# Patient Record
Sex: FEMALE | Race: ASIAN | Employment: FULL TIME | URBAN - METROPOLITAN AREA
[De-identification: names, ages, dates, MRNs, and addresses within clinical notes are randomized per-mention and may not be internally consistent; named-entity substitution may affect disease eponyms.]

---

## 2021-06-28 ENCOUNTER — LAB ENCOUNTER (OUTPATIENT)
Dept: LAB | Age: 36
End: 2021-06-28
Attending: OBSTETRICS & GYNECOLOGY
Payer: COMMERCIAL

## 2021-06-28 DIAGNOSIS — Z01.818 PREOP TESTING: ICD-10-CM

## 2021-06-29 LAB — SARS-COV-2 RNA RESP QL NAA+PROBE: NOT DETECTED

## 2021-06-30 ENCOUNTER — ANESTHESIA EVENT (OUTPATIENT)
Dept: SURGERY | Facility: HOSPITAL | Age: 36
End: 2021-06-30
Payer: COMMERCIAL

## 2021-06-30 ENCOUNTER — HOSPITAL ENCOUNTER (OUTPATIENT)
Facility: HOSPITAL | Age: 36
Setting detail: HOSPITAL OUTPATIENT SURGERY
Discharge: HOME | End: 2021-06-30
Attending: OBSTETRICS & GYNECOLOGY | Admitting: OBSTETRICS & GYNECOLOGY
Payer: COMMERCIAL

## 2021-06-30 ENCOUNTER — ANESTHESIA (OUTPATIENT)
Dept: SURGERY | Facility: HOSPITAL | Age: 36
End: 2021-06-30
Payer: COMMERCIAL

## 2021-06-30 VITALS
SYSTOLIC BLOOD PRESSURE: 93 MMHG | BODY MASS INDEX: 20.37 KG/M2 | RESPIRATION RATE: 19 BRPM | HEIGHT: 63 IN | WEIGHT: 115 LBS | DIASTOLIC BLOOD PRESSURE: 59 MMHG | TEMPERATURE: 98 F | HEART RATE: 61 BPM | OXYGEN SATURATION: 100 %

## 2021-06-30 DIAGNOSIS — Z01.818 PREOP TESTING: Primary | ICD-10-CM

## 2021-06-30 LAB — B-HCG UR QL: NEGATIVE

## 2021-06-30 PROCEDURE — 0UDB8ZX EXTRACTION OF ENDOMETRIUM, VIA NATURAL OR ARTIFICIAL OPENING ENDOSCOPIC, DIAGNOSTIC: ICD-10-PCS | Performed by: OBSTETRICS & GYNECOLOGY

## 2021-06-30 PROCEDURE — 88305 TISSUE EXAM BY PATHOLOGIST: CPT | Performed by: OBSTETRICS & GYNECOLOGY

## 2021-06-30 PROCEDURE — 81025 URINE PREGNANCY TEST: CPT

## 2021-06-30 PROCEDURE — 0UB98ZX EXCISION OF UTERUS, VIA NATURAL OR ARTIFICIAL OPENING ENDOSCOPIC, DIAGNOSTIC: ICD-10-PCS | Performed by: OBSTETRICS & GYNECOLOGY

## 2021-06-30 RX ORDER — MIDAZOLAM HYDROCHLORIDE 1 MG/ML
INJECTION INTRAMUSCULAR; INTRAVENOUS AS NEEDED
Status: DISCONTINUED | OUTPATIENT
Start: 2021-06-30 | End: 2021-06-30 | Stop reason: SURG

## 2021-06-30 RX ORDER — IBUPROFEN 600 MG/1
600 TABLET ORAL EVERY 8 HOURS PRN
Qty: 10 TABLET | Refills: 0 | Status: SHIPPED | OUTPATIENT
Start: 2021-06-30

## 2021-06-30 RX ORDER — HYDROMORPHONE HYDROCHLORIDE 1 MG/ML
0.6 INJECTION, SOLUTION INTRAMUSCULAR; INTRAVENOUS; SUBCUTANEOUS EVERY 5 MIN PRN
Status: DISCONTINUED | OUTPATIENT
Start: 2021-06-30 | End: 2021-06-30

## 2021-06-30 RX ORDER — ACETAMINOPHEN 500 MG
1000 TABLET ORAL ONCE
Status: COMPLETED | OUTPATIENT
Start: 2021-06-30 | End: 2021-06-30

## 2021-06-30 RX ORDER — MORPHINE SULFATE 4 MG/ML
4 INJECTION, SOLUTION INTRAMUSCULAR; INTRAVENOUS EVERY 10 MIN PRN
Status: DISCONTINUED | OUTPATIENT
Start: 2021-06-30 | End: 2021-06-30

## 2021-06-30 RX ORDER — PROCHLORPERAZINE EDISYLATE 5 MG/ML
5 INJECTION INTRAMUSCULAR; INTRAVENOUS ONCE AS NEEDED
Status: DISCONTINUED | OUTPATIENT
Start: 2021-06-30 | End: 2021-06-30

## 2021-06-30 RX ORDER — DEXAMETHASONE SODIUM PHOSPHATE 4 MG/ML
VIAL (ML) INJECTION AS NEEDED
Status: DISCONTINUED | OUTPATIENT
Start: 2021-06-30 | End: 2021-06-30 | Stop reason: SURG

## 2021-06-30 RX ORDER — HYDROMORPHONE HYDROCHLORIDE 1 MG/ML
0.4 INJECTION, SOLUTION INTRAMUSCULAR; INTRAVENOUS; SUBCUTANEOUS EVERY 5 MIN PRN
Status: DISCONTINUED | OUTPATIENT
Start: 2021-06-30 | End: 2021-06-30

## 2021-06-30 RX ORDER — SODIUM CHLORIDE, SODIUM LACTATE, POTASSIUM CHLORIDE, CALCIUM CHLORIDE 600; 310; 30; 20 MG/100ML; MG/100ML; MG/100ML; MG/100ML
INJECTION, SOLUTION INTRAVENOUS CONTINUOUS
Status: DISCONTINUED | OUTPATIENT
Start: 2021-06-30 | End: 2021-06-30

## 2021-06-30 RX ORDER — KETOROLAC TROMETHAMINE 30 MG/ML
INJECTION, SOLUTION INTRAMUSCULAR; INTRAVENOUS AS NEEDED
Status: DISCONTINUED | OUTPATIENT
Start: 2021-06-30 | End: 2021-06-30 | Stop reason: SURG

## 2021-06-30 RX ORDER — MORPHINE SULFATE 4 MG/ML
2 INJECTION, SOLUTION INTRAMUSCULAR; INTRAVENOUS EVERY 10 MIN PRN
Status: DISCONTINUED | OUTPATIENT
Start: 2021-06-30 | End: 2021-06-30

## 2021-06-30 RX ORDER — HYDROCODONE BITARTRATE AND ACETAMINOPHEN 5; 325 MG/1; MG/1
1 TABLET ORAL AS NEEDED
Status: DISCONTINUED | OUTPATIENT
Start: 2021-06-30 | End: 2021-06-30

## 2021-06-30 RX ORDER — NALOXONE HYDROCHLORIDE 0.4 MG/ML
80 INJECTION, SOLUTION INTRAMUSCULAR; INTRAVENOUS; SUBCUTANEOUS AS NEEDED
Status: DISCONTINUED | OUTPATIENT
Start: 2021-06-30 | End: 2021-06-30

## 2021-06-30 RX ORDER — LIDOCAINE HYDROCHLORIDE 10 MG/ML
INJECTION, SOLUTION EPIDURAL; INFILTRATION; INTRACAUDAL; PERINEURAL AS NEEDED
Status: DISCONTINUED | OUTPATIENT
Start: 2021-06-30 | End: 2021-06-30 | Stop reason: SURG

## 2021-06-30 RX ORDER — ONDANSETRON 2 MG/ML
INJECTION INTRAMUSCULAR; INTRAVENOUS AS NEEDED
Status: DISCONTINUED | OUTPATIENT
Start: 2021-06-30 | End: 2021-06-30 | Stop reason: SURG

## 2021-06-30 RX ORDER — HYDROCODONE BITARTRATE AND ACETAMINOPHEN 5; 325 MG/1; MG/1
1-2 TABLET ORAL EVERY 4 HOURS PRN
Qty: 5 TABLET | Refills: 0 | Status: SHIPPED | OUTPATIENT
Start: 2021-06-30

## 2021-06-30 RX ORDER — HYDROCODONE BITARTRATE AND ACETAMINOPHEN 5; 325 MG/1; MG/1
2 TABLET ORAL AS NEEDED
Status: DISCONTINUED | OUTPATIENT
Start: 2021-06-30 | End: 2021-06-30

## 2021-06-30 RX ORDER — HALOPERIDOL 5 MG/ML
0.25 INJECTION INTRAMUSCULAR ONCE AS NEEDED
Status: DISCONTINUED | OUTPATIENT
Start: 2021-06-30 | End: 2021-06-30

## 2021-06-30 RX ORDER — HYDROMORPHONE HYDROCHLORIDE 1 MG/ML
0.2 INJECTION, SOLUTION INTRAMUSCULAR; INTRAVENOUS; SUBCUTANEOUS EVERY 5 MIN PRN
Status: DISCONTINUED | OUTPATIENT
Start: 2021-06-30 | End: 2021-06-30

## 2021-06-30 RX ORDER — ONDANSETRON 2 MG/ML
4 INJECTION INTRAMUSCULAR; INTRAVENOUS ONCE AS NEEDED
Status: DISCONTINUED | OUTPATIENT
Start: 2021-06-30 | End: 2021-06-30

## 2021-06-30 RX ORDER — MORPHINE SULFATE 10 MG/ML
6 INJECTION, SOLUTION INTRAMUSCULAR; INTRAVENOUS EVERY 10 MIN PRN
Status: DISCONTINUED | OUTPATIENT
Start: 2021-06-30 | End: 2021-06-30

## 2021-06-30 RX ADMIN — MIDAZOLAM HYDROCHLORIDE 2 MG: 1 INJECTION INTRAMUSCULAR; INTRAVENOUS at 07:30:00

## 2021-06-30 RX ADMIN — SODIUM CHLORIDE, SODIUM LACTATE, POTASSIUM CHLORIDE, CALCIUM CHLORIDE: 600; 310; 30; 20 INJECTION, SOLUTION INTRAVENOUS at 08:21:00

## 2021-06-30 RX ADMIN — SODIUM CHLORIDE, SODIUM LACTATE, POTASSIUM CHLORIDE, CALCIUM CHLORIDE: 600; 310; 30; 20 INJECTION, SOLUTION INTRAVENOUS at 07:30:00

## 2021-06-30 RX ADMIN — ONDANSETRON 4 MG: 2 INJECTION INTRAMUSCULAR; INTRAVENOUS at 07:40:00

## 2021-06-30 RX ADMIN — KETOROLAC TROMETHAMINE 30 MG: 30 INJECTION, SOLUTION INTRAMUSCULAR; INTRAVENOUS at 08:09:00

## 2021-06-30 RX ADMIN — LIDOCAINE HYDROCHLORIDE 50 MG: 10 INJECTION, SOLUTION EPIDURAL; INFILTRATION; INTRACAUDAL; PERINEURAL at 07:34:00

## 2021-06-30 RX ADMIN — DEXAMETHASONE SODIUM PHOSPHATE 4 MG: 4 MG/ML VIAL (ML) INJECTION at 07:40:00

## 2021-06-30 NOTE — ANESTHESIA POSTPROCEDURE EVALUATION
Patient: Khushboo Guadalupe Blew    Procedure Summary     Date: 06/30/21 Room / Location: [address] [place] [address] MAIN OR 01 / 300 [facility] MAIN OR    Anesthesia Start: 0730 Anesthesia Stop: 4663    Procedure: dilation and curettage, hysteroscopy, polypectomy with myosure (N/A Vagina ) Diagnosi

## 2021-06-30 NOTE — OPERATIVE REPORT
[facility] HOSP - [facility]    Gyne Operative Note    My Niya Genao Patient Status:  Hospital Outpatient Surgery    10/28/1985 MRN [id number]   [place] [address] Attending Francisco Bullock MD   Hosp Day # 0 ANGELO Giraldo [place]

## 2021-06-30 NOTE — ANESTHESIA PROCEDURE NOTES
Airway  Date/Time: 6/30/2021 7:35 AM  Urgency: Elective      General Information and Staff    Patient location during procedure: OR  Resident/CRNA: Kriss Frey CRNA  Performed: CRNA     Indications and Patient Condition  Indications for airway managem

## 2021-06-30 NOTE — H&P
Gyne H&P    HPI:    Presents for D&C/Hysteroscopy/Polypectomy. H/o infertility. Has been working with Dr. Roel Combs.  During w/u, endometrial polyp visualized on saline sonohystogram.         Allergies:  No Known Allergies   Current Meds:         Current Ou procedure.

## 2021-06-30 NOTE — ANESTHESIA PREPROCEDURE EVALUATION
Anesthesia PreOp Note    HPI:     Khushboo Bro is a 37year old female who presents for preoperative consultation requested by: Gayatri Burger MD    Date of Surgery: 6/30/2021    Procedure(s):  dilation and curettage, hysteroscopy, polypectomy with salvador Activity      Alcohol use: Not Currently      Drug use: No      Sexual activity: Yes        Partners: Male        Birth control/protection: Pill    Other Topics      Concerns:        Not on file    Social History Narrative      Not on file    Social Determ Endo/Other    Abdominal  - normal exam               Anesthesia Plan:   ASA:  2  Plan:   General  Airway:  LMA  Informed Consent Plan and Risks Discussed With:  Patient      I have informed My Zeinab Gulling and/or legal guardian or family member of the macarena

## 2021-07-02 NOTE — PROGRESS NOTES
Can we please send this path to Dr. Suzette Trevino along with my op report? Thank you! Cameron Dan,  Your pathology from the hysteroscopy/polypectomy returned back with benign tissue which is great news. We will send this result to Dr. Suzette Trevino.   Hope you have a gre

## 2025-03-05 ENCOUNTER — TELEPHONE (OUTPATIENT)
Dept: OBGYN CLINIC | Facility: CLINIC | Age: 40
End: 2025-03-05

## 2025-03-05 NOTE — TELEPHONE ENCOUNTER
Left message in regards to scheduling a 2 week post partum visit appointment to be scheduled around or after 4.2.25 , as well as 6 week postpartum visit due around or after  4.30.25

## 2025-03-17 ENCOUNTER — ANESTHESIA (OUTPATIENT)
Dept: OBGYN UNIT | Facility: HOSPITAL | Age: 40
End: 2025-03-17
Payer: COMMERCIAL

## 2025-03-17 ENCOUNTER — HOSPITAL ENCOUNTER (INPATIENT)
Facility: HOSPITAL | Age: 40
LOS: 2 days | Discharge: HOME | End: 2025-03-19
Attending: STUDENT IN AN ORGANIZED HEALTH CARE EDUCATION/TRAINING PROGRAM | Admitting: STUDENT IN AN ORGANIZED HEALTH CARE EDUCATION/TRAINING PROGRAM
Payer: COMMERCIAL

## 2025-03-17 ENCOUNTER — ANESTHESIA EVENT (OUTPATIENT)
Dept: OBGYN UNIT | Facility: HOSPITAL | Age: 40
End: 2025-03-17
Payer: COMMERCIAL

## 2025-03-17 ENCOUNTER — TELEPHONE (OUTPATIENT)
Dept: OBGYN UNIT | Facility: HOSPITAL | Age: 40
End: 2025-03-17

## 2025-03-17 DIAGNOSIS — Z98.891 S/P CESAREAN SECTION: ICD-10-CM

## 2025-03-17 DIAGNOSIS — G89.18 ACUTE POST-OPERATIVE PAIN: Primary | ICD-10-CM

## 2025-03-17 PROBLEM — Z34.90 PREGNANCY (HCC): Status: ACTIVE | Noted: 2025-03-17

## 2025-03-17 LAB
ANTIBODY SCREEN: NEGATIVE
BASOPHILS # BLD AUTO: 0.05 X10(3) UL (ref 0–0.2)
BASOPHILS NFR BLD AUTO: 0.6 %
DEPRECATED RDW RBC AUTO: 56.1 FL (ref 35.1–46.3)
EOSINOPHIL # BLD AUTO: 0.14 X10(3) UL (ref 0–0.7)
EOSINOPHIL NFR BLD AUTO: 1.6 %
ERYTHROCYTE [DISTWIDTH] IN BLOOD BY AUTOMATED COUNT: 16.3 % (ref 11–15)
HCT VFR BLD AUTO: 36 %
HGB BLD-MCNC: 12.5 G/DL
IMM GRANULOCYTES # BLD AUTO: 0.13 X10(3) UL (ref 0–1)
IMM GRANULOCYTES NFR BLD: 1.5 %
LYMPHOCYTES # BLD AUTO: 1.4 X10(3) UL (ref 1–4)
LYMPHOCYTES NFR BLD AUTO: 16.3 %
MCH RBC QN AUTO: 32.6 PG (ref 26–34)
MCHC RBC AUTO-ENTMCNC: 34.7 G/DL (ref 31–37)
MCV RBC AUTO: 93.8 FL
MONOCYTES # BLD AUTO: 0.71 X10(3) UL (ref 0.1–1)
MONOCYTES NFR BLD AUTO: 8.3 %
NEUTROPHILS # BLD AUTO: 6.15 X10 (3) UL (ref 1.5–7.7)
NEUTROPHILS # BLD AUTO: 6.15 X10(3) UL (ref 1.5–7.7)
NEUTROPHILS NFR BLD AUTO: 71.7 %
PLATELET # BLD AUTO: 283 10(3)UL (ref 150–450)
RBC # BLD AUTO: 3.84 X10(6)UL
RH BLOOD TYPE: POSITIVE
RH BLOOD TYPE: POSITIVE
T PALLIDUM AB SER QL IA: NONREACTIVE
WBC # BLD AUTO: 8.6 X10(3) UL (ref 4–11)

## 2025-03-17 PROCEDURE — 59514 CESAREAN DELIVERY ONLY: CPT | Performed by: OBSTETRICS & GYNECOLOGY

## 2025-03-17 RX ORDER — HYDROCODONE BITARTRATE AND ACETAMINOPHEN 7.5; 325 MG/1; MG/1
2 TABLET ORAL EVERY 6 HOURS PRN
Status: DISCONTINUED | OUTPATIENT
Start: 2025-03-17 | End: 2025-03-18

## 2025-03-17 RX ORDER — LIDOCAINE HYDROCHLORIDE 10 MG/ML
INJECTION, SOLUTION INFILTRATION; PERINEURAL
Status: COMPLETED | OUTPATIENT
Start: 2025-03-17 | End: 2025-03-17

## 2025-03-17 RX ORDER — HYDROMORPHONE HYDROCHLORIDE 1 MG/ML
0.6 INJECTION, SOLUTION INTRAMUSCULAR; INTRAVENOUS; SUBCUTANEOUS
Status: DISCONTINUED | OUTPATIENT
Start: 2025-03-17 | End: 2025-03-18

## 2025-03-17 RX ORDER — EPHEDRINE SULFATE 50 MG/ML
INJECTION INTRAVENOUS AS NEEDED
Status: DISCONTINUED | OUTPATIENT
Start: 2025-03-17 | End: 2025-03-17 | Stop reason: SURG

## 2025-03-17 RX ORDER — DOCUSATE SODIUM 100 MG/1
100 CAPSULE, LIQUID FILLED ORAL
Status: DISCONTINUED | OUTPATIENT
Start: 2025-03-17 | End: 2025-03-19

## 2025-03-17 RX ORDER — ACETAMINOPHEN 325 MG/1
650 TABLET ORAL EVERY 6 HOURS PRN
Status: DISCONTINUED | OUTPATIENT
Start: 2025-03-17 | End: 2025-03-18

## 2025-03-17 RX ORDER — AMMONIA 15 % (W/V)
0.3 AMPUL (EA) INHALATION AS NEEDED
Status: DISCONTINUED | OUTPATIENT
Start: 2025-03-17 | End: 2025-03-19

## 2025-03-17 RX ORDER — FAMOTIDINE 20 MG/1
20 TABLET, FILM COATED ORAL ONCE
Status: COMPLETED | OUTPATIENT
Start: 2025-03-17 | End: 2025-03-17

## 2025-03-17 RX ORDER — FERROUS SULFATE 324(65)MG
324 TABLET, DELAYED RELEASE (ENTERIC COATED) ORAL AS DIRECTED
COMMUNITY

## 2025-03-17 RX ORDER — PROCHLORPERAZINE EDISYLATE 5 MG/ML
5 INJECTION INTRAMUSCULAR; INTRAVENOUS ONCE AS NEEDED
Status: COMPLETED | OUTPATIENT
Start: 2025-03-17 | End: 2025-03-17

## 2025-03-17 RX ORDER — MORPHINE SULFATE 1 MG/ML
INJECTION, SOLUTION EPIDURAL; INTRATHECAL; INTRAVENOUS
Status: COMPLETED | OUTPATIENT
Start: 2025-03-17 | End: 2025-03-17

## 2025-03-17 RX ORDER — ONDANSETRON 2 MG/ML
4 INJECTION INTRAMUSCULAR; INTRAVENOUS ONCE AS NEEDED
Status: COMPLETED | OUTPATIENT
Start: 2025-03-17 | End: 2025-03-17

## 2025-03-17 RX ORDER — NALBUPHINE HYDROCHLORIDE 10 MG/ML
2.5 INJECTION INTRAMUSCULAR; INTRAVENOUS; SUBCUTANEOUS EVERY 4 HOURS PRN
Status: DISCONTINUED | OUTPATIENT
Start: 2025-03-17 | End: 2025-03-18

## 2025-03-17 RX ORDER — ONDANSETRON 2 MG/ML
4 INJECTION INTRAMUSCULAR; INTRAVENOUS EVERY 6 HOURS PRN
Status: DISCONTINUED | OUTPATIENT
Start: 2025-03-17 | End: 2025-03-17 | Stop reason: HOSPADM

## 2025-03-17 RX ORDER — SODIUM CHLORIDE, SODIUM LACTATE, POTASSIUM CHLORIDE, CALCIUM CHLORIDE 600; 310; 30; 20 MG/100ML; MG/100ML; MG/100ML; MG/100ML
INJECTION, SOLUTION INTRAVENOUS CONTINUOUS
Status: DISCONTINUED | OUTPATIENT
Start: 2025-03-17 | End: 2025-03-19

## 2025-03-17 RX ORDER — NALOXONE HYDROCHLORIDE 0.4 MG/ML
0.08 INJECTION, SOLUTION INTRAMUSCULAR; INTRAVENOUS; SUBCUTANEOUS
Status: DISCONTINUED | OUTPATIENT
Start: 2025-03-17 | End: 2025-03-18

## 2025-03-17 RX ORDER — HALOPERIDOL 5 MG/ML
0.5 INJECTION INTRAMUSCULAR ONCE AS NEEDED
Status: ACTIVE | OUTPATIENT
Start: 2025-03-17 | End: 2025-03-17

## 2025-03-17 RX ORDER — SODIUM CHLORIDE, SODIUM LACTATE, POTASSIUM CHLORIDE, CALCIUM CHLORIDE 600; 310; 30; 20 MG/100ML; MG/100ML; MG/100ML; MG/100ML
125 INJECTION, SOLUTION INTRAVENOUS CONTINUOUS
Status: DISCONTINUED | OUTPATIENT
Start: 2025-03-17 | End: 2025-03-17 | Stop reason: HOSPADM

## 2025-03-17 RX ORDER — BISACODYL 10 MG
10 SUPPOSITORY, RECTAL RECTAL ONCE AS NEEDED
Status: DISCONTINUED | OUTPATIENT
Start: 2025-03-17 | End: 2025-03-19

## 2025-03-17 RX ORDER — PROCHLORPERAZINE EDISYLATE 5 MG/ML
5 INJECTION INTRAMUSCULAR; INTRAVENOUS ONCE AS NEEDED
Status: ACTIVE | OUTPATIENT
Start: 2025-03-17 | End: 2025-03-17

## 2025-03-17 RX ORDER — BUPIVACAINE HYDROCHLORIDE 7.5 MG/ML
INJECTION, SOLUTION INTRASPINAL
Status: COMPLETED | OUTPATIENT
Start: 2025-03-17 | End: 2025-03-17

## 2025-03-17 RX ORDER — GABAPENTIN 300 MG/1
300 CAPSULE ORAL EVERY 8 HOURS PRN
Status: DISCONTINUED | OUTPATIENT
Start: 2025-03-17 | End: 2025-03-19

## 2025-03-17 RX ORDER — ONDANSETRON 2 MG/ML
4 INJECTION INTRAMUSCULAR; INTRAVENOUS EVERY 6 HOURS PRN
Status: DISCONTINUED | OUTPATIENT
Start: 2025-03-17 | End: 2025-03-19

## 2025-03-17 RX ORDER — DIPHENHYDRAMINE HCL 25 MG
25 CAPSULE ORAL EVERY 4 HOURS PRN
Status: DISCONTINUED | OUTPATIENT
Start: 2025-03-17 | End: 2025-03-18

## 2025-03-17 RX ORDER — SIMETHICONE 80 MG
80 TABLET,CHEWABLE ORAL 3 TIMES DAILY PRN
Status: DISCONTINUED | OUTPATIENT
Start: 2025-03-17 | End: 2025-03-19

## 2025-03-17 RX ORDER — ONDANSETRON 2 MG/ML
4 INJECTION INTRAMUSCULAR; INTRAVENOUS ONCE AS NEEDED
Status: ACTIVE | OUTPATIENT
Start: 2025-03-17 | End: 2025-03-17

## 2025-03-17 RX ORDER — HYDROMORPHONE HYDROCHLORIDE 1 MG/ML
0.3 INJECTION, SOLUTION INTRAMUSCULAR; INTRAVENOUS; SUBCUTANEOUS EVERY 2 HOUR PRN
Status: DISCONTINUED | OUTPATIENT
Start: 2025-03-17 | End: 2025-03-19

## 2025-03-17 RX ORDER — CITRIC ACID/SODIUM CITRATE 334-500MG
30 SOLUTION, ORAL ORAL ONCE
Status: COMPLETED | OUTPATIENT
Start: 2025-03-17 | End: 2025-03-17

## 2025-03-17 RX ORDER — HYDROMORPHONE HYDROCHLORIDE 1 MG/ML
0.4 INJECTION, SOLUTION INTRAMUSCULAR; INTRAVENOUS; SUBCUTANEOUS
Status: DISCONTINUED | OUTPATIENT
Start: 2025-03-17 | End: 2025-03-18

## 2025-03-17 RX ORDER — ACETAMINOPHEN 500 MG
1000 TABLET ORAL EVERY 6 HOURS
Status: DISCONTINUED | OUTPATIENT
Start: 2025-03-17 | End: 2025-03-19

## 2025-03-17 RX ORDER — METOCLOPRAMIDE 10 MG/1
10 TABLET ORAL ONCE
Status: COMPLETED | OUTPATIENT
Start: 2025-03-17 | End: 2025-03-17

## 2025-03-17 RX ORDER — HYDROCODONE BITARTRATE AND ACETAMINOPHEN 7.5; 325 MG/1; MG/1
1 TABLET ORAL EVERY 6 HOURS PRN
Status: DISCONTINUED | OUTPATIENT
Start: 2025-03-17 | End: 2025-03-18

## 2025-03-17 RX ORDER — ACETAMINOPHEN 500 MG
1000 TABLET ORAL ONCE
Status: COMPLETED | OUTPATIENT
Start: 2025-03-17 | End: 2025-03-17

## 2025-03-17 RX ORDER — OXYCODONE HYDROCHLORIDE 5 MG/1
5 TABLET ORAL EVERY 6 HOURS PRN
Status: DISCONTINUED | OUTPATIENT
Start: 2025-03-17 | End: 2025-03-19

## 2025-03-17 RX ORDER — DIPHENHYDRAMINE HYDROCHLORIDE 50 MG/ML
12.5 INJECTION, SOLUTION INTRAMUSCULAR; INTRAVENOUS EVERY 4 HOURS PRN
Status: DISCONTINUED | OUTPATIENT
Start: 2025-03-17 | End: 2025-03-18

## 2025-03-17 RX ORDER — METHYLERGONOVINE MALEATE 0.2 MG/ML
INJECTION INTRAVENOUS
Status: COMPLETED
Start: 2025-03-17 | End: 2025-03-17

## 2025-03-17 RX ORDER — NALBUPHINE HYDROCHLORIDE 10 MG/ML
2.5 INJECTION INTRAMUSCULAR; INTRAVENOUS; SUBCUTANEOUS
Status: DISCONTINUED | OUTPATIENT
Start: 2025-03-17 | End: 2025-03-19

## 2025-03-17 RX ORDER — FAMOTIDINE 10 MG/ML
20 INJECTION, SOLUTION INTRAVENOUS ONCE
Status: COMPLETED | OUTPATIENT
Start: 2025-03-17 | End: 2025-03-17

## 2025-03-17 RX ORDER — KETOROLAC TROMETHAMINE 30 MG/ML
30 INJECTION, SOLUTION INTRAMUSCULAR; INTRAVENOUS ONCE
Status: COMPLETED | OUTPATIENT
Start: 2025-03-17 | End: 2025-03-17

## 2025-03-17 RX ORDER — METOCLOPRAMIDE HYDROCHLORIDE 5 MG/ML
10 INJECTION INTRAMUSCULAR; INTRAVENOUS ONCE
Status: COMPLETED | OUTPATIENT
Start: 2025-03-17 | End: 2025-03-17

## 2025-03-17 RX ADMIN — SODIUM CHLORIDE, SODIUM LACTATE, POTASSIUM CHLORIDE, CALCIUM CHLORIDE: 600; 310; 30; 20 INJECTION, SOLUTION INTRAVENOUS at 18:55:00

## 2025-03-17 RX ADMIN — MORPHINE SULFATE 0.3 MG: 1 INJECTION, SOLUTION EPIDURAL; INTRATHECAL; INTRAVENOUS at 17:46:00

## 2025-03-17 RX ADMIN — BUPIVACAINE HYDROCHLORIDE 1.5 ML: 7.5 INJECTION, SOLUTION INTRASPINAL at 17:46:00

## 2025-03-17 RX ADMIN — EPHEDRINE SULFATE 5 MG: 50 INJECTION INTRAVENOUS at 17:49:00

## 2025-03-17 RX ADMIN — LIDOCAINE HYDROCHLORIDE 5 ML: 10 INJECTION, SOLUTION INFILTRATION; PERINEURAL at 17:41:00

## 2025-03-17 NOTE — ANESTHESIA PROCEDURE NOTES
Spinal Block    Date/Time: 3/17/2025 5:41 PM    Performed by: Joby Kay MD  Authorized by: Joby Kay MD      General Information and Staff    Start Time:  3/17/2025 5:41 PM  End Time:  3/17/2025 5:50 PM  Anesthesiologist:  Joby Kay MD  Performed by:  Anesthesiologist  Patient Location:  OB  Preanesthetic Checklist: patient identified, IV checked, risks and benefits discussed, monitors and equipment checked, pre-op evaluation, timeout performed, anesthesia consent and sterile technique used      Procedure Details    Patient Position:  Sitting  Prep: ChloraPrep    Monitoring:  Cardiac monitor  Approach:  Midline  Location:  L3-4  Injection Technique:  Single-shot    Needle    Needle Type:  Pencil-tip  Needle Gauge:  24 G  Needle Length:  3.5 in    Assessment    Sensory Level:   Events: clear CSF, CSF aspirated, well tolerated and blood negative      Additional Comments

## 2025-03-17 NOTE — PROGRESS NOTES
Pt is a 39 year old female admitted to TR5/TR5-A.     Chief Complaint   Patient presents with    Other     Add on       Pt is  39w2d intra-uterine pregnancy.  History obtained, consents signed. Oriented to room, staff, and plan of care.

## 2025-03-17 NOTE — ANESTHESIA PREPROCEDURE EVALUATION
Anesthesia PreOp Note    HPI:     Khushboo Gaytan is a 39 year old female who presents for preoperative consultation requested by: Trena Branch DO    Date of Surgery: 3/17/2025    Procedure(s):   SECTION  Indication: breech baby ayana ro c/s    Relevant Problems   No relevant active problems       NPO:  Last Liquid Consumption Date: 25  Last Liquid Consumption Time: 0800  Last Solid Consumption Date: 25  Last Solid Consumption Time: 0800  Last Liquid Consumption Date: 25          History Review:  Patient Active Problem List    Diagnosis Date Noted    Pregnancy (HCC) 2025    Constipation 2009    Hematochezia 2009    Scoliosis 2009    Wart 2009    Abnormal Pap Smear in past 2009       Past Medical History:    Abnormal Pap Smear in past       Past Surgical History:   Procedure Laterality Date    Other surgical history  08    COLPOSCOPY       Prescriptions Prior to Admission[1]  Current Medications and Prescriptions Ordered in Epic[2]    Allergies[3]    Family History   Problem Relation Age of Onset    High Blood Pressure Mother     High Cholesterol Mother     Diabetes Maternal Grandfather     Diabetes Paternal Grandfather     Cancer Other         cervical     Social History     Socioeconomic History    Marital status:    Occupational History    Occupation: dental assist   Tobacco Use    Smoking status: Never    Smokeless tobacco: Never   Vaping Use    Vaping status: Never Used   Substance and Sexual Activity    Alcohol use: Not Currently    Drug use: No    Sexual activity: Yes     Partners: Male     Birth control/protection: Pill       Available pre-op labs reviewed.  Lab Results   Component Value Date    WBC 8.6 2025    RBC 3.84 2025    HGB 12.5 2025    HCT 36.0 2025    MCV 93.8 2025    MCH 32.6 2025    MCHC 34.7 2025    RDW 16.3 (H) 2025    .0 2025             Vital Signs:  Body mass  index is 27.46 kg/m².   height is 1.6 m (5' 3\") and weight is 70.3 kg (155 lb). Her oral temperature is 97.6 °F (36.4 °C). Her blood pressure is 118/69 and her pulse is 73. Her respiration is 16.   Vitals:    03/17/25 1338 03/17/25 1345   BP: 118/69    Pulse: 73    Resp: 16    Temp: 97.6 °F (36.4 °C)    TempSrc: Oral    Weight:  70.3 kg (155 lb)   Height:  1.6 m (5' 3\")        Anesthesia Evaluation     Patient summary reviewed and Nursing notes reviewed    No history of anesthetic complications   Airway   Mallampati: II  Neck ROM: full  Dental      Pulmonary - negative ROS and normal exam   Cardiovascular - negative ROS and normal exam    Neuro/Psych - negative ROS     GI/Hepatic/Renal - negative ROS     Endo/Other - negative ROS   Abdominal  - normal exam                 Anesthesia Plan:   ASA:  2  Plan:   Spinal  Post-op Pain Management: Intrathecal narcotics  Informed Consent Plan and Risks Discussed With:  Patient      I have informed My Brooks S Gaytan and/or legal guardian or family member of the nature of the anesthetic plan, benefits, risks including possible dental damage if relevant, major complications, and any alternative forms of anesthetic management.   All of the patient's questions were answered to the best of my ability. The patient desires the anesthetic management as planned.  Joby Kay MD  3/17/2025 2:55 PM  Present on Admission:  **None**           [1]   Medications Prior to Admission   Medication Sig Dispense Refill Last Dose/Taking    Ferrous Sulfate 324 (65 Fe) MG Oral Tab EC Take 324 mg by mouth As Directed.   3/16/2025    Ferrous Sulfate (IRON OR) Take 1 tablet by mouth daily.   3/16/2025    Omega-3 Fatty Acids (FISH OIL) 300 MG Oral Cap Take 300 mg by mouth As Directed.   3/16/2025   [2]   Current Facility-Administered Medications Ordered in Epic   Medication Dose Route Frequency Provider Last Rate Last Admin    lactated ringers infusion  125 mL/hr Intravenous Continuous Trena Branch  DO        acetaminophen (Tylenol Extra Strength) tab 1,000 mg  1,000 mg Oral Once Jose Carlos, Trena, DO        ondansetron (Zofran) 4 MG/2ML injection 4 mg  4 mg Intravenous Q6H PRN Jose Carlos, Trena, DO        sodium citrate-citric acid (Bicitra) 500-334 MG/5ML oral solution 30 mL  30 mL Oral Once Jose Carlos, Trena, DO        oxyTOCIN in sodium chloride 0.9% (Pitocin) 30 Units/500mL infusion premix  62.5-900 adam-units/min Intravenous Continuous Jose Carlos, Trena, DO        famotidine (Pepcid) tab 20 mg  20 mg Oral Once Jose Carlos, Trena, DO        Or    famotidine (Pepcid) 20 mg/2mL injection 20 mg  20 mg Intravenous Once Jose Carlos, Trena, DO        metoclopramide (Reglan) tab 10 mg  10 mg Oral Once Jose Carlos, Trena, DO        Or    metoclopramide (Reglan) 5 mg/mL injection 10 mg  10 mg Intravenous Once Trena Branch, DO        ceFAZolin (Ancef) 2g in 10mL IV syringe premix  2 g Intravenous Once Trena Branch, DO         No current Epic-ordered outpatient medications on file.   [3]   Allergies  Allergen Reactions    Ibuprofen HIVES

## 2025-03-17 NOTE — H&P
[facility]  part of [facility]    History & Physical    Khushboo Gaytan Patient Status:  Inpatient    10/28/1985 MRN [id number]   Location [facility] Attending Trena Branch DO   Hosp Day # 0 PCP Thalia Canada MD     Date of Admission:  3/17/2025      HPI:   Khushboo Gaytan is a 39 year old  female, current EGA of 39w2d with an estimated date of delivery of: 3/22/2025, by Other Basis who presents due to sent from office for oligohydramnios.    Being admitted for .      Her current obstetrical history is significant for IVF.    Patient Active Problem List   Diagnosis    Constipation    Hematochezia    Scoliosis    Wart    Abnormal Pap Smear in past    Pregnancy ([facility])         History   Obstetric History:   OB History    Para Term  AB Living   2 0 0 0 1 0   SAB IAB Ectopic Multiple Live Births   1 0 0 0        # Outcome Date GA Lbr Nain/2nd Weight Sex Type Anes PTL Lv   2 Current            1 SAB                Past Medical History:   Past Medical History:    Abnormal Pap Smear in past       Past Surgerical History:   Past Surgical History:   Procedure Laterality Date    Other surgical history  08    COLPOSCOPY       Social History:   Social History     Tobacco Use    Smoking status: Never    Smokeless tobacco: Never   Substance Use Topics    Alcohol use: Not Currently        Allergies/Medications:   Allergies:   Allergies[1]    Medications:  Prescriptions Prior to Admission[2]      Review of Systems:   As documented in HPI      Physical Exam:   Temp:  [97.6 °F (36.4 °C)] 97.6 °F (36.4 °C)  Pulse:  [73] 73  Resp:  [16] 16  BP: (118)/(69) 118/69    Neurologic: Alert and oriented  Psychiatric: Cooperative  Constitutional: alert and cooperative  Pulm: non labored breathing  CV: regular rate  Abdomen: soft,  nontender, gravid    SVE:     FHT assessment:       Results:     Recent Results (from the past 24 hours)   CBC With Differential With  Platelet    Collection Time: 25  1:20 PM   Result Value Ref Range    WBC 8.6 4.0 - 11.0 x10(3) uL    RBC 3.84 3.80 - 5.30 x10(6)uL    HGB 12.5 12.0 - 16.0 g/dL    HCT 36.0 35.0 - 48.0 %    MCV 93.8 80.0 - 100.0 fL    MCH 32.6 26.0 - 34.0 pg    MCHC 34.7 31.0 - 37.0 g/dL    RDW-SD 56.1 (H) 35.1 - 46.3 fL    RDW 16.3 (H) 11.0 - 15.0 %    .0 150.0 - 450.0 10(3)uL    Neutrophil Absolute Prelim 6.15 1.50 - 7.70 x10 (3) uL    Neutrophil Absolute 6.15 1.50 - 7.70 x10(3) uL    Lymphocyte Absolute 1.40 1.00 - 4.00 x10(3) uL    Monocyte Absolute 0.71 0.10 - 1.00 x10(3) uL    Eosinophil Absolute 0.14 0.00 - 0.70 x10(3) uL    Basophil Absolute 0.05 0.00 - 0.20 x10(3) uL    Immature Granulocyte Absolute 0.13 0.00 - 1.00 x10(3) uL    Neutrophil % 71.7 %    Lymphocyte % 16.3 %    Monocyte % 8.3 %    Eosinophil % 1.6 %    Basophil % 0.6 %    Immature Granulocyte % 1.5 %   T Pallidum Screening Cascade    Collection Time: 25  1:20 PM   Result Value Ref Range    Treponemal Antibodies Nonreactive Nonreactive    ABORH (Blood Type)    Collection Time: 25  1:20 PM   Result Value Ref Range    ABO BLOOD TYPE O     RH BLOOD TYPE Positive    Antibody Screen    Collection Time: 25  1:20 PM   Result Value Ref Range    Antibody Screen Negative        Prenatal Labs  Blood Type:  Rh positive  HIV- non reactive  Syphilis- non reactive  Hepatitis B: non reactive  Hepatitis C: non reactive  Rubella immune  GBS negative.       Imaging:  Reviewed   32w growth  Single IUP in breech presentation.  Cardiac activity is present at 139 bpm.  Placenta is anterior, right lateral.   A 3 vessel cord is noted.  EFW 2088 g (4 lb 10 oz) 52%.   GERSON is 11.7 cm. MVP is 4.6 cm.  BPP is 8/8     US done in office today  Borderline oligohydramnios. GERSON 5.4cm in office this am.   Remains breech      Assessment/Plan:     Khushboo Gaytan is a 39 year old  female, current EGA of 39w2d who presents for admission due to primary   section for breech presentation, unscheduled due to diagnosis of borderline oligohydramnios this morning.    Discussed risks including infection, bleeding, injury to nearby structures. Consents signed  2g Jovon Branch DO  3/17/2025  3:02 PM         [1]   Allergies  Allergen Reactions    Ibuprofen HIVES   [2]   Medications Prior to Admission   Medication Sig Dispense Refill Last Dose/Taking    Ferrous Sulfate 324 (65 Fe) MG Oral Tab EC Take 324 mg by mouth As Directed.   3/16/2025    Ferrous Sulfate (IRON OR) Take 1 tablet by mouth daily.   3/16/2025    Omega-3 Fatty Acids (FISH OIL) 300 MG Oral Cap Take 300 mg by mouth As Directed.   3/16/2025

## 2025-03-17 NOTE — PLAN OF CARE
Problem: BIRTH - VAGINAL/ SECTION  Goal: Fetal and maternal status remain reassuring during the birth process  Description: INTERVENTIONS:- Monitor vital signs- Monitor fetal heart rate- Monitor uterine activity- Monitor labor progression (vaginal delivery)- DVT prophylaxis (C/S delivery)- Surgical antibiotic prophylaxis (C/S delivery)  Outcome: Progressing     Problem: PAIN - ADULT  Goal: Verbalizes/displays adequate comfort level or patient's stated pain goal  Description: INTERVENTIONS:- Encourage pt to monitor pain and request assistance- Assess pain using appropriate pain scale- Administer analgesics based on type and severity of pain and evaluate response- Implement non-pharmacological measures as appropriate and evaluate response- Consider cultural and social influences on pain and pain management- Manage/alleviate anxiety- Utilize distraction and/or relaxation techniques- Monitor for opioid side effects- Notify MD/LIP if interventions unsuccessful or patient reports new pain- Anticipate increased pain with activity and pre-medicate as appropriate  Outcome: Progressing     Problem: ANXIETY  Goal: Will report anxiety at manageable levels  Description: INTERVENTIONS:- Administer medication as ordered- Teach and rehearse alternative coping skills- Provide emotional support with 1:1 interaction with staff  Outcome: Progressing     Problem: Patient Centered Care  Goal: Patient preferences are identified and integrated in the patient's plan of care  Description: Interventions:- What would you like us to know as we care for you? Were having a girl!   - Provide timely, complete, and accurate information to patient/family- Incorporate patient and family knowledge, values, beliefs, and cultural backgrounds into the planning and delivery of care- Encourage patient/family to participate in care and decision-making at the level they choose- Honor patient and family perspectives and choices  Outcome:  Progressing     Problem: Patient/Family Goals  Goal: Patient/Family Long Term Goal  Description: Patient's Long Term Goal: Uncomplicated Delivery     Interventions:  - Assessment/Monitoring  - Induction/Augmentation per protocol and Provider order  - C/S per protocol and Provider order   - Education  - Intervention per protocol and Provider order with education   - Involve patient in POC  - See additional Care Plan goals for specific interventions  Outcome: Progressing  Goal: Patient/Family Short Term Goal  Description: Patient's Short Term Goal: Comfort and Pain Control     Interventions:   - Non Pharmacological pain intervention   - IV/IM and Epidural pain medication per Provider order and patient request  - Education  - Involve Patient in POC   - See additional Care Plan goals for specific interventions  Outcome: Progressing

## 2025-03-18 LAB
BASOPHILS # BLD AUTO: 0.04 X10(3) UL (ref 0–0.2)
BASOPHILS NFR BLD AUTO: 0.4 %
DEPRECATED RDW RBC AUTO: 56.2 FL (ref 35.1–46.3)
EOSINOPHIL # BLD AUTO: 0.04 X10(3) UL (ref 0–0.7)
EOSINOPHIL NFR BLD AUTO: 0.4 %
ERYTHROCYTE [DISTWIDTH] IN BLOOD BY AUTOMATED COUNT: 16.5 % (ref 11–15)
HCT VFR BLD AUTO: 25.2 %
HGB BLD-MCNC: 8.4 G/DL
IMM GRANULOCYTES # BLD AUTO: 0.08 X10(3) UL (ref 0–1)
IMM GRANULOCYTES NFR BLD: 0.9 %
LYMPHOCYTES # BLD AUTO: 1.01 X10(3) UL (ref 1–4)
LYMPHOCYTES NFR BLD AUTO: 10.9 %
MCH RBC QN AUTO: 31.3 PG (ref 26–34)
MCHC RBC AUTO-ENTMCNC: 33.3 G/DL (ref 31–37)
MCV RBC AUTO: 94 FL
MONOCYTES # BLD AUTO: 0.71 X10(3) UL (ref 0.1–1)
MONOCYTES NFR BLD AUTO: 7.6 %
NEUTROPHILS # BLD AUTO: 7.42 X10 (3) UL (ref 1.5–7.7)
NEUTROPHILS # BLD AUTO: 7.42 X10(3) UL (ref 1.5–7.7)
NEUTROPHILS NFR BLD AUTO: 79.8 %
PLATELET # BLD AUTO: 231 10(3)UL (ref 150–450)
RBC # BLD AUTO: 2.68 X10(6)UL
WBC # BLD AUTO: 9.3 X10(3) UL (ref 4–11)

## 2025-03-18 RX ORDER — KETOROLAC TROMETHAMINE 30 MG/ML
30 INJECTION, SOLUTION INTRAMUSCULAR; INTRAVENOUS EVERY 6 HOURS
Status: DISCONTINUED | OUTPATIENT
Start: 2025-03-18 | End: 2025-03-19

## 2025-03-18 NOTE — PLAN OF CARE
Problem: BIRTH - VAGINAL/ SECTION  Goal: Fetal and maternal status remain reassuring during the birth process  Description: INTERVENTIONS:- Monitor vital signs- Monitor fetal heart rate- Monitor uterine activity- Monitor labor progression (vaginal delivery)- DVT prophylaxis (C/S delivery)- Surgical antibiotic prophylaxis (C/S delivery)  Outcome: Completed     Problem: PAIN - ADULT  Goal: Verbalizes/displays adequate comfort level or patient's stated pain goal  Description: INTERVENTIONS:- Encourage pt to monitor pain and request assistance- Assess pain using appropriate pain scale- Administer analgesics based on type and severity of pain and evaluate response- Implement non-pharmacological measures as appropriate and evaluate response- Consider cultural and social influences on pain and pain management- Manage/alleviate anxiety- Utilize distraction and/or relaxation techniques- Monitor for opioid side effects- Notify MD/LIP if interventions unsuccessful or patient reports new pain- Anticipate increased pain with activity and pre-medicate as appropriate  Outcome: Completed     Problem: ANXIETY  Goal: Will report anxiety at manageable levels  Description: INTERVENTIONS:- Administer medication as ordered- Teach and rehearse alternative coping skills- Provide emotional support with 1:1 interaction with staff  Outcome: Completed     Problem: Patient Centered Care  Goal: Patient preferences are identified and integrated in the patient's plan of care  Description: Interventions:- What would you like us to know as we care for you? - Provide timely, complete, and accurate information to patient/family- Incorporate patient and family knowledge, values, beliefs, and cultural backgrounds into the planning and delivery of care- Encourage patient/family to participate in care and decision-making at the level they choose- Honor patient and family perspectives and choices  Outcome: Progressing     Problem:  POSTPARTUM  Goal: Long Term Goal:Experiences normal postpartum course  Description: INTERVENTIONS:- Assess and monitor vital signs and lab values.- Assess fundus and lochia.- Provide ice/sitz baths for perineum discomfort.- Monitor healing of incision/episiotomy/laceration, and assess for signs and symptoms of infection and hematoma.- Assess bladder function and monitor for bladder distention.- Provide/instruct/assist with pericare as needed.- Provide VTE prophylaxis as needed.- Monitor bowel function.- Encourage ambulation and provide assistance as needed.- Assess and monitor emotional status and provide social service/psych resources as needed.- Utilize standard precautions and use personal protective equipment as indicated. Ensure aseptic care of all intravenous lines and invasive tubes/drains.- Obtain immunization and exposure to communicable diseases history.  Outcome: Progressing  Goal: Optimize infant feeding at the breast  Description: INTERVENTIONS:- Initiate breast feeding within first hour after birth. - Monitor effectiveness of current breast feeding efforts.- Assess support systems available to mother/family.- Identify cultural beliefs/practices regarding lactation, letdown techniques, maternal food preferences.- Assess mother's knowledge and previous experience with breast feeding.- Provide information as needed about early infant feeding cues (e.g., rooting, lip smacking, sucking fingers/hand) versus late cue of crying.- Discuss/demonstrate breast feeding aids (e.g., infant sling, nursing footstool/pillows, and breast pumps).- Encourage mother/other family members to express feelings/concerns, and actively listen.- Educate father/SO about benefits of breast feeding and how to manage common lactation challenges.- Recommend avoidance of specific medications or substances incompatible with breast feeding.- Assess and monitor for signs of nipple pain/trauma.- Instruct and provide assistance with proper  latch.- Review techniques for milk expression (breast pumping) and storage of breast milk. Provide pumping equipment/supplies, instructions and assistance, as needed.- Encourage rooming-in and breast feeding on demand.- Encourage skin-to-skin contact.- Provide LC support as needed.- Assess for and manage engorgement.- Provide breast feeding education handouts and information on community breast feeding support.   Outcome: Progressing  Goal: Establishment of adequate milk supply with medication/procedure interruptions  Description: INTERVENTIONS:- Review techniques for milk expression (breast pumping). - Provide pumping equipment/supplies, instructions, and assistance until it is safe to breastfeed infant.  Outcome: Progressing  Goal: Experiences normal breast weaning course  Description: INTERVENTIONS:- Assess for and manage engorgement.- Instruct on breast care.- Provide comfort measures.  Outcome: Progressing  Goal: Appropriate maternal -  bonding  Description: INTERVENTIONS:- Assess caregiver- interactions.- Assess caregiver's emotional status and coping mechanisms.- Encourage caregiver to participate in  daily care.- Assess support systems available to mother/family.- Provide /case management support as needed.  Outcome: Progressing      no

## 2025-03-18 NOTE — OPERATIVE REPORT
[facility]  part of [facility]     Delivery Operative Note      Khushboo Gaytan Patient Status:  Inpatient    10/28/1985 MRN [id number]   Location [facility] Attending Trena Branch DO   Hosp Day # 0 PCP Thalia Canada MD     Date of Service: 3/17/2025  Anesthesia Staff: Anesthesiologist [facility]: Joby Kay MD  OR Staff: Circulating Nurse.: Audelia Marcus RN  Scrub Person.: Arjun Braun  L&D Baby Nurse: Doris Dumont RN  Neonatologist: Rupal Benjamin MD                                         Surgeon(s):  Surgeons and Role:     * Trena Branch DO - Primary     * Bibiana Castaneda MD - Assisting Surgeon  The involvement of the assisting physician was necessary in order to provide aid in exposure/retraction, hemostasis, closure, and other intraoperative technical functions in order to facilitate me as the primary surgeon carry out a safe operation with optimized results/outcome.                                       Procedure(s) performed: Primary  Section    Pre-Operative Diagnoses: malpresentation, oligohydramnios  Post-Operative Diagnoses: same as preop  Antibiotics: 2g Ancef  Type of Anesthesia used: Spinal    Procedure in detail:  The patient was taken to the operating room where spinal anesthesia was found to be adequate. She was then prepped and draped in the supine position with a left tilt.   At this point I was called to a delivery and patient was monitored by the anesthesiologist. I returned after 15 minutes to start the procedure. Time out was performed. A pfannenstiel skin incision was made with a scalpel and carried through to the underlying layer of fascia which was incised in the midline. The fascia was extended laterally sharply. The fascia was dissected off the muscles bluntly and sharply. The rectus muscles were  in the midline and the peritoneum was entered bluntly.  The peritoneal incision was extended  superiorly and inferiorly.  Shaheed-O self retaining retractor was then placed in the abdominal cavity.  A low transverse incision was made on the uterus with a scalpel.  Membranes were ruptured for clear fluid. The infant was delivered from a breech position. The infant was not vigorous and delayed cord clamping was only observed for 10 seconds. The cord was then doubly clamped and cut and infant handed off to the nursery staff.  Cord gases were collected.  The placenta was delivered  spontaneously with uterine massage.  The uterus was cleared of all clot and debris.  The uterus was closed with 0-vicryl in a running locked fashion. An additional imbricating suture of 0-Vicryl was placed. The incision appeared hemostatic. The bilateral adnexa were examined and appeared normal. The subfascial tissue was examined and was noted to be hemostatic. The fascia was then closed with 0-PDS in a running fashion. The subcutaneous tissue was irrigated and made hemostatic with cautery as needed. The skin was closed was 4-0 Monocryl. The patient tolerated the procedure well. Sponge lap and needle counts were correct times two.  She was taken to the recovery room in stable condition.     Operative findings: normal pelvic anatomy    QBL: Quantitative Blood Loss (mL) 707     Specimens: none    Hemorrhage?: No, patient is stable, asymptomatic and blood loss is within expected amount following delivery. Standard treatment provided to prevent PPH. Patient does not meet ACOG criteria for hemorrhage at this time.      Intra and Immediate Post-op Complications: None    Trena Branch DO  3/17/2025  7:09 PM    CSN: [id number]

## 2025-03-18 NOTE — PROGRESS NOTES
Patient transferred to mother/baby room 365 per cart in stable condition with baby and personal belongings.  Accompanied by care partner and staff.  Report given to Sara mother/baby RN.

## 2025-03-18 NOTE — LACTATION NOTE
03/18/25 1645   Evaluation Type   Evaluation Type Inpatient   Problems identified   Problems identified Knowledge deficit   Maternal history   Maternal history Caesarean section   Breastfeeding goal   Breastfeeding goal To maintain breast milk feeding per patient goal   Maternal Assessment   Bilateral Breasts Symmetrical;Dense   Bilateral Nipples Colostrum difficult to express;Thick/dense   Prior breastfeeding experience (comment below) Primip   Breastfeeding Assistance Breastfeeding assistance provided with permission;Breast exam provided with permission   Pain assessment   Pain scale comment denies   Treatment of Sore Nipples Deeper latch techniques;Expressed breast milk   Guidelines for use of:   Suggested use of pump For comfort as needed;Pump if infant is not latching to breast   Other (comment) LC assistance offered. Infant was at the breast at time of LC visit in football hold. A few suckling bursts noted but infant was pushing off the breast so LC brought infant back skin to skin. LC then assisted with a latch on the right side in semi laid back cradle position. Sustained latch achieved with a few suckling bursts. Infant was still pretty sleepy but did respond nicely to breast compression. No swallows heard. Infant did take a lot of stimulation to continue. Infant then was taken off the breast and was placed skin to skin. LC educated on 2nd night feeding patterns and if infant is still really sleepy at the breast, then would need to pump and patient is ammendable to that plan.

## 2025-03-18 NOTE — ANESTHESIA POSTPROCEDURE EVALUATION
Patient: Khushboo Gaytan    Procedure Summary       Date: 25 Room / Location: [facility] L+D OR  [facility] L+D OR    Anesthesia Start:  Anesthesia Stop:     Procedure:  SECTION (Abdomen) Diagnosis: (Same as pre op)    Surgeons: Trena Branch DO Anesthesiologist: Joby Kay MD    Anesthesia Type: spinal ASA Status: 2            Anesthesia Type: spinal    Vitals Value Taken Time   /53 25   Temp 97.7 25   Pulse 88 25   Resp 20 25   SpO2 100 % 25   Vitals shown include unfiled device data.    [facility] AN Post Evaluation:   Patient Evaluated in PACU  Patient Participation: complete - patient participated  Level of Consciousness: awake  Pain Management: adequate  Airway Patency:patent  Dental exam unchanged from preop  Yes    Cardiovascular Status: acceptable  Respiratory Status: acceptable  Postoperative Hydration acceptable      Joby Kay MD  3/17/2025 7:06 PM

## 2025-03-18 NOTE — CM/SW NOTE
Sw received MDO for SDOH- stress    SW met with patient and FOB bedside.  SW confirmed face sheet contact as correct.    Baby girl name: Gracie  Date & time of delivery: 3/17/2025  6:23 PM   Delivery method:    SECTION     Siblings age: NA    Patient employed: Yes   Length of maternity leave: 12 weeks    Father of baby employed: Yes  Length of paternity leave: As needed     Breast or formula feed: Breast    Pediatrician: No    Infant Insurance: [employer]  Change HC contacted: NA    Mental Health History: Denies    Medications: Denies    Therapist: Serge    Psychiatrist: Serge SMART discussed signs, symptoms and risks associated with post partum depression & anxiety.  SW provided pt with PMAD resources.  Other resources provided: SDOH [place], Mom line, Mood boost, Stress coping skills    Patient support system: FOB and Family     Patient denied current questions/needs from BING.    SW to remain available for support and/or discharge planning.    Lacy Ang MSW, LSW   [id number]

## 2025-03-18 NOTE — PLAN OF CARE
Problem: Patient Centered Care  Goal: Patient preferences are identified and integrated in the patient's plan of care  Description: Interventions:- What would you like us to know as we care for you? First baby, breastfeeding.  in room.  - Provide timely, complete, and accurate information to patient/family- Incorporate patient and family knowledge, values, beliefs, and cultural backgrounds into the planning and delivery of care- Encourage patient/family to participate in care and decision-making at the level they choose- Honor patient and family perspectives and choices  Outcome: Progressing     Problem: POSTPARTUM  Goal: Long Term Goal:Experiences normal postpartum course  Description: INTERVENTIONS:- Assess and monitor vital signs and lab values.- Assess fundus and lochia.- Provide ice/sitz baths for perineum discomfort.- Monitor healing of incision/episiotomy/laceration, and assess for signs and symptoms of infection and hematoma.- Assess bladder function and monitor for bladder distention.- Provide/instruct/assist with pericare as needed.- Provide VTE prophylaxis as needed.- Monitor bowel function.- Encourage ambulation and provide assistance as needed.- Assess and monitor emotional status and provide social service/psych resources as needed.- Utilize standard precautions and use personal protective equipment as indicated. Ensure aseptic care of all intravenous lines and invasive tubes/drains.- Obtain immunization and exposure to communicable diseases history.  Outcome: Progressing  Goal: Optimize infant feeding at the breast  Description: INTERVENTIONS:- Initiate breast feeding within first hour after birth. - Monitor effectiveness of current breast feeding efforts.- Assess support systems available to mother/family.- Identify cultural beliefs/practices regarding lactation, letdown techniques, maternal food preferences.- Assess mother's knowledge and previous experience with breast feeding.- Provide  information as needed about early infant feeding cues (e.g., rooting, lip smacking, sucking fingers/hand) versus late cue of crying.- Discuss/demonstrate breast feeding aids (e.g., infant sling, nursing footstool/pillows, and breast pumps).- Encourage mother/other family members to express feelings/concerns, and actively listen.- Educate father/SO about benefits of breast feeding and how to manage common lactation challenges.- Recommend avoidance of specific medications or substances incompatible with breast feeding.- Assess and monitor for signs of nipple pain/trauma.- Instruct and provide assistance with proper latch.- Review techniques for milk expression (breast pumping) and storage of breast milk. Provide pumping equipment/supplies, instructions and assistance, as needed.- Encourage rooming-in and breast feeding on demand.- Encourage skin-to-skin contact.- Provide LC support as needed.- Assess for and manage engorgement.- Provide breast feeding education handouts and information on community breast feeding support.   Outcome: Progressing  Goal: Establishment of adequate milk supply with medication/procedure interruptions  Description: INTERVENTIONS:- Review techniques for milk expression (breast pumping). - Provide pumping equipment/supplies, instructions, and assistance until it is safe to breastfeed infant.  Outcome: Progressing  Goal: Appropriate maternal -  bonding  Description: INTERVENTIONS:- Assess caregiver- interactions.- Assess caregiver's emotional status and coping mechanisms.- Encourage caregiver to participate in  daily care.- Assess support systems available to mother/family.- Provide /case management support as needed.  Outcome: Progressing

## 2025-03-18 NOTE — PROGRESS NOTES
Received patient into room 365 via WC . Bedside shift report received from ABDOULAYE Smith. Pt transferred to bed. Bed in lowest and locked position. Side rails up x2. VSS. IV site unremarkable. Baby present in open crib.  ID bands matched with L&D RN.  Patient and family oriented to unit, room and call light within reach.  Safety measures in place, POC followed. Will continue to monitor per protocol.     Advised to call for assistance to bathroom.

## 2025-03-18 NOTE — LACTATION NOTE
This note was copied from a baby's chart.     03/18/25 1030   Evaluation Type   Evaluation Type Inpatient   Problems & Assessment   Problems Diagnosed or Identified Sleepy   Problems: comment/detail  visit at 16 hours of age, no hunger cues present. Follow up later today   Feeding Assessment   Summary Current Feeding Breastfeeding exclusively   Breastfeeding Assessment Assisted with breastfeeding w/mother's permission;No sustained latch to breast;Sleepy infant, quickly pacifies   Breastfeeding Positions football;laid back;right breast   Latch 0   Audible Sucks/Swallows 0   Type of Nipple 2   Comfort (Breast/Nipple) 2   Hold (Positioning) 1   LATCH Score 5

## 2025-03-18 NOTE — PROGRESS NOTES
[facility]  Anesthesiology Epidural Follow-up Note  3/18/2025    Patient name: Khushboo Gaytan 39 year old female  : 10/28/1985  MRN: [id number]    Diagnosis: [unfilled]    S/P:     Pain treatment modality: Duramorph    Current hospital day: Hospital Day: 2    Pain Scores: 1    Current Medications:  Scheduled Meds:   ketorolac  30 mg Intravenous Q6H    acetaminophen  1,000 mg Oral Q6H    docusate sodium  100 mg Oral BID@0600,1800     Continuous Infusions:   lactated ringers 125 mL/hr at 25 0720     PRN Meds:.  nalbuphine    naloxone    acetaminophen    HYDROcodone-acetaminophen    HYDROcodone-acetaminophen    HYDROmorphone    HYDROmorphone    diphenhydrAMINE **OR** diphenhydrAMINE    nalbuphine    naloxone    acetaminophen    HYDROcodone-acetaminophen    HYDROcodone-acetaminophen    HYDROmorphone    HYDROmorphone    diphenhydrAMINE **OR** diphenhydrAMINE    nalbuphine    gabapentin    magnesium hydroxide    bisacodyl    ondansetron    witch hazel-glycerin    phenylephrine-min oil-erik    simethicone    ammonia aromatic    HYDROmorphone    oxyCODONE    Anticoagulation:       Alfonso catheter:      Assessment:  No complications from neuro axial anesthesia    Plan:  P.o. meds as needed    IMTIAZ Al MD  Anesthesia Acute Pain Service [phone]

## 2025-03-18 NOTE — PROGRESS NOTES
[facility]  part of [facility]    OB/GYNE Progress Note      My Todd Gaytan Patient Status:  Inpatient    10/28/1985 MRN [id number]   Location [facility] 3SE Attending Trena Branch DO   Hosp Day # 1 PCP Thalia Canada MD       Assessment/Plan       Assessment  Problems:   Patient Active Problem List   Diagnosis    Constipation    Hematochezia    Scoliosis    Wart    Abnormal Pap Smear in past    Pregnancy (HCC)    Breech presentation, no version (HCC)          POD #1  Abdominal binder  Routine cares        Subjective       Review of Systems:  Constitutional: feeling better and pain improved      Objective   Vital signs in last 24 hours:  Temp:  [97.6 °F (36.4 °C)-98.7 °F (37.1 °C)] 97.6 °F (36.4 °C)  Pulse:  [73-96] 73  Resp:  [14-21] 15  BP: (111-140)/(49-80) 117/65  SpO2:  [99 %-100 %] 100 %    Input/Output:    Intake/Output Summary (Last 24 hours) at 3/18/2025 1414  Last data filed at 3/18/2025 1145  Gross per 24 hour   Intake 4145.83 ml   Output 2512 ml   Net 1633.83 ml       Weight (Last 6):  Wt Readings from Last 6 Encounters:   25 155 lb (70.3 kg)   22 110 lb (49.9 kg)   22 113 lb 6.4 oz (51.4 kg)   22 116 lb (52.6 kg)   21 115 lb (52.2 kg)   21 113 lb (51.3 kg)     Body mass index is 27.46 kg/m².    Constitutional: comfortable  Uterus: fundus firm and fundas appropriately tender  Wound/Incision:  dressing intact and in place and stable outline of pad staining  Extremities: SCDs on and functioning      Mccrary Catheter Information  Does patient have a mccrary catheter: no      Results:     Lab Results   Component Value Date    WBC 9.3 2025    HGB 8.4 2025    HCT 25.2 2025    .0 2025         No results found.    Chrissy Mathur MD  3/18/2025  2:14 PM

## 2025-03-19 VITALS
WEIGHT: 155 LBS | HEART RATE: 85 BPM | TEMPERATURE: 98 F | BODY MASS INDEX: 27.46 KG/M2 | RESPIRATION RATE: 16 BRPM | DIASTOLIC BLOOD PRESSURE: 85 MMHG | HEIGHT: 63 IN | OXYGEN SATURATION: 100 % | SYSTOLIC BLOOD PRESSURE: 135 MMHG

## 2025-03-19 LAB — HIV 1+2 AB+HIV1 P24 AG SERPL QL IA: NONREACTIVE

## 2025-03-19 RX ORDER — GABAPENTIN 300 MG/1
300 CAPSULE ORAL EVERY 8 HOURS PRN
Qty: 30 CAPSULE | Refills: 0 | Status: SHIPPED | OUTPATIENT
Start: 2025-03-19

## 2025-03-19 RX ORDER — OXYCODONE HYDROCHLORIDE 5 MG/1
5 TABLET ORAL EVERY 6 HOURS PRN
Qty: 10 TABLET | Refills: 0 | Status: SHIPPED | OUTPATIENT
Start: 2025-03-19

## 2025-03-19 NOTE — PAYOR COMM NOTE
--------------  ADMISSION REVIEW     Payor: [facility] C[employer]/NAVIGATE  Subscriber #:  [id number]  Authorization Number: [id number]      Admit date: 3/17/25  Admit time: 12:46 PM                                            Procedure(s) performed: Primary  Section     Pre-Operative Diagnoses: malpresentation, oligohydramnios  Post-Operative Diagnoses: same as preop  Antibiotics: 2g Ancef  Type of Anesthesia used: Spinal                 MEDICATIONS ADMINISTERED IN LAST 1 DAY:  acetaminophen (Tylenol Extra Strength) tab 1,000 mg       Date Action Dose Route User    3/19/2025 0810 Given 1,000 mg Oral Renae Sanchez RN    3/19/2025 0211 Given 1,000 mg Oral Qi Colbert RN    3/18/2025 1947 Given 1,000 mg Oral Sara Braun RN    3/18/2025 1338 Given 1,000 mg Oral Carleen Hunter RN          docusate sodium (Colace) cap 100 mg       Date Action Dose Route User    3/19/2025 0604 Given 100 mg Oral Sara Braun RN    3/18/2025 1825 Given 100 mg Oral Paige Sainz RN          gabapentin (Neurontin) cap 300 mg       Date Action Dose Route User    3/18/2025 1947 Given 300 mg Oral Sara Braun RN          ketorolac (Toradol) 30 MG/ML injection 30 mg       Date Action Dose Route User    3/19/2025 0604 Given 30 mg Intravenous Sara Braun RN    3/18/2025 2309 Given 30 mg Intravenous Qi Colbert RN    3/18/2025 1619 Given 30 mg Intravenous Carleen Hunter RN            Vitals (last day)       Date/Time Temp Pulse Resp BP SpO2 Weight O2 Device O2 Flow Rate (L/min) [facility]    25 0800 97.5 °F (36.4 °C) 85 16 135/85 -- -- -- --     25 194 98 °F (36.7 °C) 88 16 101/60 -- -- None (Room air) -- TT    25 1619 -- 88 -- 125/77 -- -- None (Room air) --     03/18/25 1618 97.8 °F (36.6 °C) -- 16 -- -- -- -- --     25 1145 97.6 °F (36.4 °C) 73 15 117/65 100 % -- None (Room air) --     25 1130 -- -- -- -- 100 % -- -- --     25 0800 97.8 °F (36.6 °C) 86 16 123/66 100 % -- None (Room air)  --     03/18/25 0330 98.7 °F (37.1 °C) 74 16 115/68 100 % -- None (Room air) -- TT          CIWA Scores (since admission)       None

## 2025-03-19 NOTE — DISCHARGE PLANNING
Discharge order received from MD. Postpartum instructions reviewed, all questions answered at this time. ID bands matched with baby bands. Patient informed when to make a follow-up appointment with OB. Verbalized understanding of follow-up instructions. Discharged to home in stable condition via wheelchair.

## 2025-03-19 NOTE — DISCHARGE SUMMARY
Postpartum Progress Note -  Section    Assessment/Plan    Postpartum Day #2 from  Delivery: Doing well    - Pregnancy complicated by breech presentation, oligohydramnios  - Hgb 8.4. Postoperative anemia noted, iron therapy reviewed and initiated.  - Continue routine postpartum care.  - Anticipate discharge home today. Patient aware to schedule 2 week incision check and 6 week postpartum visit. Pain medications and oral iron supplement sent to patient's pharmacy.     Subjective:  The patient feels well. Pain is well controlled with current medications. Vaginal bleeding is appropriate. The patient is ambulating well. The patient is tolerating a normal diet. The baby is well.    Objective:  Vital signs in last 24 hours:  Temp:  [97.5 °F (36.4 °C)-98 °F (36.7 °C)] 97.5 °F (36.4 °C)  Pulse:  [85-88] 85  Resp:  [16] 16  BP: (101-135)/(60-85) 135/85    General: Alert and oriented, no distress  Uterine Fundus: Firm, nontender  Incision: clean, dry, intact  DVT Evaluation: No evidence of DVT    Admission Date: 3/17/2025

## 2025-03-19 NOTE — PLAN OF CARE
Problem: Patient Centered Care  Goal: Patient preferences are identified and integrated in the patient's plan of care  Description: Interventions:- What would you like us to know as we care for you? - Provide timely, complete, and accurate information to patient/family- Incorporate patient and family knowledge, values, beliefs, and cultural backgrounds into the planning and delivery of care- Encourage patient/family to participate in care and decision-making at the level they choose- Honor patient and family perspectives and choices  Outcome: Progressing     Problem: POSTPARTUM  Goal: Long Term Goal:Experiences normal postpartum course  Description: INTERVENTIONS:- Assess and monitor vital signs and lab values.- Assess fundus and lochia.- Provide ice/sitz baths for perineum discomfort.- Monitor healing of incision/episiotomy/laceration, and assess for signs and symptoms of infection and hematoma.- Assess bladder function and monitor for bladder distention.- Provide/instruct/assist with pericare as needed.- Provide VTE prophylaxis as needed.- Monitor bowel function.- Encourage ambulation and provide assistance as needed.- Assess and monitor emotional status and provide social service/psych resources as needed.- Utilize standard precautions and use personal protective equipment as indicated. Ensure aseptic care of all intravenous lines and invasive tubes/drains.- Obtain immunization and exposure to communicable diseases history.  Outcome: Progressing  Goal: Optimize infant feeding at the breast  Description: INTERVENTIONS:- Initiate breast feeding within first hour after birth. - Monitor effectiveness of current breast feeding efforts.- Assess support systems available to mother/family.- Identify cultural beliefs/practices regarding lactation, letdown techniques, maternal food preferences.- Assess mother's knowledge and previous experience with breast feeding.- Provide information as needed about early infant  feeding cues (e.g., rooting, lip smacking, sucking fingers/hand) versus late cue of crying.- Discuss/demonstrate breast feeding aids (e.g., infant sling, nursing footstool/pillows, and breast pumps).- Encourage mother/other family members to express feelings/concerns, and actively listen.- Educate father/SO about benefits of breast feeding and how to manage common lactation challenges.- Recommend avoidance of specific medications or substances incompatible with breast feeding.- Assess and monitor for signs of nipple pain/trauma.- Instruct and provide assistance with proper latch.- Review techniques for milk expression (breast pumping) and storage of breast milk. Provide pumping equipment/supplies, instructions and assistance, as needed.- Encourage rooming-in and breast feeding on demand.- Encourage skin-to-skin contact.- Provide LC support as needed.- Assess for and manage engorgement.- Provide breast feeding education handouts and information on community breast feeding support.   Outcome: Progressing  Goal: Establishment of adequate milk supply with medication/procedure interruptions  Description: INTERVENTIONS:- Review techniques for milk expression (breast pumping). - Provide pumping equipment/supplies, instructions, and assistance until it is safe to breastfeed infant.  Outcome: Progressing  Goal: Experiences normal breast weaning course  Description: INTERVENTIONS:- Assess for and manage engorgement.- Instruct on breast care.- Provide comfort measures.  Outcome: Progressing  Goal: Appropriate maternal -  bonding  Description: INTERVENTIONS:- Assess caregiver- interactions.- Assess caregiver's emotional status and coping mechanisms.- Encourage caregiver to participate in  daily care.- Assess support systems available to mother/family.- Provide /case management support as needed.  Outcome: Progressing     Problem: GASTROINTESTINAL - ADULT  Goal: Minimal or absence of nausea  and vomiting  Description: INTERVENTIONS:- Maintain adequate hydration with IV or PO as ordered and tolerated- Nasogastric tube to low intermittent suction as ordered- Evaluate effectiveness of ordered antiemetic medications- Provide nonpharmacologic comfort measures as appropriate- Advance diet as tolerated, if ordered- Obtain nutritional consult as needed- Evaluate fluid balance  Outcome: Progressing  Goal: Maintains or returns to baseline bowel function  Description: INTERVENTIONS:- Assess bowel function- Maintain adequate hydration with IV or PO as ordered and tolerated- Evaluate effectiveness of GI medications- Encourage mobilization and activity- Obtain nutritional consult as needed- Establish a toileting routine/schedule- Consider collaborating with pharmacy to review patient's medication profile  Outcome: Progressing  Goal: Maintains adequate nutritional intake (undernourished)  Description: INTERVENTIONS:- Monitor percentage of each meal consumed- Identify factors contributing to decreased intake, treat as appropriate- Assist with meals as needed- Monitor I&O, WT and lab values- Obtain nutritional consult as needed- Optimize oral hygiene and moisture- Encourage food from home; allow for food preferences- Enhance eating environment  Outcome: Progressing  Goal: Achieves appropriate nutritional intake (bariatric)  Description: INTERVENTIONS:- Monitor for over-consumption- Identify factors contributing to increased intake, treat as appropriate- Monitor I&O, WT and lab values- Obtain nutritional consult as needed- Evaluate psychosocial factors contributing to over-consumption  Outcome: Progressing     Problem: GENITOURINARY - ADULT  Goal: Absence of urinary retention  Description: INTERVENTIONS:- Assess patient’s ability to void and empty bladder- Monitor intake/output and perform bladder scan as needed- Follow urinary retention protocol/standard of care- Consider collaborating with pharmacy to review patient's  medication profile- Implement strategies to promote bladder emptying  Outcome: Progressing

## 2025-04-02 ENCOUNTER — NURSE ONLY (OUTPATIENT)
Dept: LACTATION | Facility: HOSPITAL | Age: 40
End: 2025-04-02
Attending: STUDENT IN AN ORGANIZED HEALTH CARE EDUCATION/TRAINING PROGRAM
Payer: COMMERCIAL

## 2025-04-02 NOTE — PROGRESS NOTES
Birth Weight: 3220 gms  7 lbs 1.6 ounces  Today's Naked Weight: 3378gms 7lbs 7 ounces     FEEDING PLAN:     Breastfeeding frequency:  8-12x/day  Supplementation:  See paced bottle feeding below if supplementation by bottle is indicated. Recommend using the \"T\" nipple by Dr. Obregon since it has a slower flow. Continue to supplement by bottle until breast milk transfer with or without the nipple shield is sufficient.      Pumping: Continue to pump if baby is not latching or if a nipple shield is used until milk transfer can be measureed. If baby latches well without the shield and has a sustained, nutritive breastfeeding, pumping after feeding is not necessary.      Adjust pump settings: increase vacuum/suction to maximum level that is comfortably tolerated ~ adjust stimulation mode/expression mode according to milk release.      Follow up: With Pediatrician as directed. With lactation, 4/21/2025 at 11 am. Refer to additional support groups/resources below.                                            PLAN FOR HOME:         Recommend plenty of skin to skin and breastfeeding attempts with early hunger cues. Attempt to breastfeed with each feeding. Offer 1 to 1.5 ounces (30mls-45mls) of pumped breast milk supplementation if baby becomes frustrated at the breast, then offer breast again. Use the nipple shield, if needed. Goal is to breastfeed without the nipple shield.       Stop BF when baby becomes fussy, starts pulling on nipple or writhing at breast.  Burp as needed.  Supplement with 2-3 ounces BM or formula every feeding, minimum 8 feedings in 24 hours.               ADDITIONAL INFORMATION:      Follow-up:  Call lactation [phone] as needed. Schedule follow-up lactation consult as needed.   Appointment with baby’s doctor planned.  Attend Mommy and Baby Support Group.  (check website www.ee[employer].org under classes or call class registration at [phone])  Call your baby's doctor with questions as  well     Snuggle your baby in skin to skin contact between and during feedings whenever possible.     Massage your breasts before nursing or pumping to soften areola if needed.     Breastfeed with hunger cues: Most babies will breastfeed 8-12 times every 24 hours with some clustered breastfeeding, especially during growth spurts.      Positioning:   Baby facing mom with mouth at nipple level. Bring infant to the breast not the breast to the infant.  Make sure infant is fully turned towards you with head aligned with the shoulders for latch and arms hugging you.  Baby should approach breast reaching up with the chin lifted.  Chin is deep into the breast and nose is slightly away from breast after latch.  Make small adjustments in position for your comfort and to help make space for the infant's nose if needed.        Deep Latching on:  Express drops of milk onto your baby’s lips to encourage latching if needed.  Aim your nipple to baby’s nose  Wait for a wide mouth and push your nipple in the mouth as you bring infant fully onto the breast.  Observe for mouth \"planted\" on the breast and for good jaw movement with suck.     Nipple shield: Use if infant unable to maintain latch or nipple(s) are too sore to latch   without a shield:   Use nipple shield (Medela  _2060-90_   mm)   Check for swallowing with most sucks until satisfied.   Read nipple shield instructions.  Have weight checked within the first week and if diapers decrease.      Is baby taking enough breast milk?  Swallowing with most sucks (every 1-3 sucks) until satisfied at least 8-12 times every 24 hours.  Compressing the breast when your baby sucks can increase milk flow.  At least 6-8 wet diapers and at least 3-4 soft, yellow seedy stools every 24 hours. Use the breastfeeding journal to keep a record.   Weight gain of at least 4-7 ounces per week for the first 3 months after return to birth weight.     Supplementation  Your baby's doctor has advised that  supplementation is needed.  Breast feed infant at every feeding and give 60-90 ml as tolerated.  Use your expressed breast milk as the supplement or formula if breast milk is not to volume infant requires.     Hour of Age  Intake (mL/feed)  1st 24 hours 2-10  24-48 hours 5-15  48-72 hours 15-30  72-96 hours 30-60  Day 10: 2-3 ounces per feeding.  4 weeks: 3-4 ounces or more per feeding.     Paced bottle feeding using a slow flow nipple:      Hold your baby in an upright position, supporting the hand and neck with your hand, rather than in the crook of your arm.   Let your baby “latch on” to the bottle: stroke nipple down from top lip to bottom, licking is good, wait for wide mouth and insert nipple with lips on base.  Angle the bottle so flow is slower. If the bottle is vertical milk will flow to quickly.  Pausing mimics breastfeeding and discourages “guzzling” the feeding.     Do I need to pump my breasts?  If supplementing:  Pump both breasts each time a supplement is given until infant nursing well.  Pump for 10-15 minutes using double electric breast pump.  Save all expressed breast milk for your infant.     Breastfeeding Journal:  Write down your baby’s feedings and diapers - if not meeting the guideline for number of diapers or feedings, call your baby’s doctor.             Call your OB doctor with any signs of     mastitis (breast infection)     Plugged area(s) are not soft within 24 hours.   Breast becomes firm, reddened, or painful.   Fever, chills, or flu-like symptoms. Check your temperature 3 times daily until a plugged duct or mastitis resolves.     If mastitis occurs:  Continue breastfeeding and/or pumping - your milk is not infected.   Continue above treatment for relieving plugged area(s)  Continue to take antibiotic as prescribed even though you may quickly feel better.   Contact your doctor is you are not feeling significantly better within 1-2 days of starting antibiotic, sooner if symptoms  worsen.     Follow up with your OB healthcare provider:  Call if a plugged duct or engorgement persists greater than 48 hours. Call if firm or reddened spots are present in breast with signs of fever, chills or flu like symptoms (possible breast infection/mastitis). Check your temperature during engorgement.        [facility] has great support for our families even after discharge.  We have virtual or in-person support groups.  Visit our website for the most up-to-date info for our many different support groups. [email or web address]        New Moms Support Groups  Our weekly New Mom Support Groups are for any new parents in our community. They are led by an experienced Mother/Baby nurse or IBCLC and usually include a guest speaker on a topic of interest to new parents. These in-person groups also include Breastfeeding Support at each meeting. Bring your baby ( - 6 months) with you! Moms-to-be are also welcome! All mom's welcome even if its not your first.      MOM & BABY HOUR   Meets most  10:00 - 11:30 a.m.  Masks are not required, but be considerate of others and do not attend if mom or baby have had any symptoms of illness within the previous 24 hours. Breastfeeding support will be included at each session--just ask the leader any breastfeeding questions you may have. Location [facility], [facility] [place] [address] [place] Go inside the front door and to the right to the “Community Education Room”.     Mom's Line: [phone]  A phone line dedicated for women (or anyone worried about a women) who may be experiencing signs or symptoms of postpartum depression, anxiety, or overwhelmed with new baby. Call - answered by live trained mental health professionals, free and confidential, emotional support, referrals, in any language.     Nurturing Mom- A support group for new and expectant moms looking for support with the transition  to parenthood as well as those experiencing symptoms of  anxiety and/or depression.  Please contact @[email or web address] if you need directions or the link for the virtual meetings. Please contact @[email or web address] if you plan to attend, but please be considerate of others and do not attend if mom or baby have had any symptoms of illness within the previous 24 hours.      La Leche League for breast feeding and parent support, Website: [email or web address]  and for the [place] group and other groups visit [email or web address]  to help find a group, all meetings are virtual.      Facebook groups-  for more support when home- Babies & Mommies [facility] --- you can find mom-to-mom advice and the list of speaker topics for cradle talk program.      Helpful websites:    www.llli.[employer]  www.[employer]  www.Breastfeed[place].org                         Pumping should not be painful.   Use nipple cream on the base of your nipples prior to pumping.  Place breast flange on your breasts, centering your nipples.    Use correct size breasts flange for your nipple size. Nipple should not rub on inside of breast flange. If you need a different size breast flange contact your nurse or the lactation department.   Set pressure gauge to minimum and turn switch on.  Increase the suction to the most suction that is comfortable for you. Remember pumping should never be painful.  If breast milk flow is minimal, try increasing pressure gauge if it is comfortable to do so.  NOTE: Initially, in the colostrum phase amounts vary from a few drops to 30cc (1oz.).  If pumping is painful, turn the pump off, reposition breast shields, check that the size is correct for your nipple, and adjust the suction. If nipple pain continues contact the lactation department or your doctor.     Ways to help your milk let down (flow) to the pump:   Massage your breasts for a few minutes prior to pumping and  massage again if the milk flow slows down during the pumping session.   Hand expression of milk before and after pumping may allow you to obtain more milk than the pump alone.   When milk flow slows, increasing pump speed back to 80 cpm (Ameda [place]) or switching pump back to “stimulation” phase to stimulate further milk ejection reflexes. Then decrease speed once milk begins to flow again.   Pump after you’ve seen, held, or touched your baby. Discuss “kangaroo care” with your baby’s nurse - holding your baby skin-to-skin on your chest when your baby is able.  Pump with baby close by or have a picture of your baby to look at while you pump  Inhale the scent of your baby from something your baby wore.  Sit back, close your eyes and imagine how sweet and soft your baby is; imagine “flowing things” like waterfalls, white rivers.  Listen to relaxing music. There are relaxation/meditation CD/tapes made especially for mothers pumping their breast milk.  Have a nice tall glass of water, juice, or milk close by to quench your thirst.  View the [facility] website videos: Maximizing Milk Production and Hand Expression   [email or web address]/[email or web address].[email or web address] (Google search: Dav maximizing milk supply)

## 2025-04-05 ENCOUNTER — TELEPHONE (OUTPATIENT)
Dept: OBGYN UNIT | Facility: HOSPITAL | Age: 40
End: 2025-04-05

## 2025-04-21 ENCOUNTER — NURSE ONLY (OUTPATIENT)
Dept: LACTATION | Facility: HOSPITAL | Age: 40
End: 2025-04-21
Attending: OBSTETRICS & GYNECOLOGY
Payer: COMMERCIAL

## 2025-04-21 DIAGNOSIS — O92.79: Primary | ICD-10-CM

## 2025-04-21 PROCEDURE — 99213 OFFICE O/P EST LOW 20 MIN: CPT

## 2025-04-21 NOTE — PROGRESS NOTES
LACTATION NOTE - MOTHER      Evaluation Type: Outpatient Follow Up    Problems identified  Problems identified: Knowledge deficit, Nipple pain, Plugged milk duct, Milk supply WNL, Engorgement    Maternal history  Maternal history: Caesarean section    Breastfeeding goal  Breastfeeding goal: To maintain breast milk feeding per patient goal    Maternal Assessment  Bilateral Nipples: Everted, Sore  Right Breast: Dense, Engorged, Erythema, Plugged duct  Right Nipple: Sore, Colostrum difficult to express, Nipple bleb, Erythema, Everted  Left Breast: Filling  Prior breastfeeding experience (comment below): Primip  Breastfeeding Assistance: Breastfeeding assistance provided with permission, Hand expression provided with permission    Pain assessment  Pain, additional: Pain location  Pain Location: Breasts, Nipples  Location/Comment: right breast/ nipple having pain, not on the left  Pain scale comment: better today, breast is less red today per patient, is still pain and sore, and more pain towards when it is time to pump/ breast fed  Treatment of Sore Nipples: Coconut oil, Deeper latch techniques, Expressed breast milk, Lanolin    Guidelines for use of:  Equipment: Lanolin  Breast pump type: Spectra (will be getting the medela symphony)  Current use of pump:: 8 times a day  Suggested use of pump: Pump 8-12X/24hr  Reported pumping volumes (ml): 3-7 oz total per session  Post-feed pumped volume: not assessed today              SITUATION:    C/O: latch difficulty & nipple bleb/ breast engorgement     Background:    HX: Since last visit Akiko has been trying to latch about once a day if possible, without the nipple shield.  She in the last few days developed a nipple bleb, and has stopped breast feeding while trying relieve it. Ni states is much better today. She pumps about 8 times a day, and Gracie will take a bottle with all feds.  Ni can get anywhere form 3-7 oz total when she pumps    BW:  7 LB 1.6 OZ     Today's  wt:  9 LB 2 OZ (4138 G)     Void/Stool: adequate    Maternal Considerations:  nipple bleb      ASSESSMENT:  Oral assessment: see tongue tie tool    Breastfeeding: Ni attempted on the left side (Gracie does not like to fed on this side), we tried for about a minute or so and baby too fussy. Assisted with attempting in the football hold on left, and too fussy to latch, but reviewed this position.  Ni able to latch Gracie for about 10 minutes on the right side, very fussy, with minimal swallows heard, and mostly chomping on the breast. We did not used the nipple shield today    Bottle: did well with minimal spillage       TOTAL MILK TRANSFERRED:  zero    RECOMMENDATION:    Based on how today's feeding went.  Gracie will  need to be supplemented after each breast feeding session.  When attempting a breast fed session, no more then 20 minutes total.      Gracie needs to fed 8-12 times a day. Offer a supplement with each fed, and increase intake to 3-4oz per fed. Aim to pump 8 times a day. We reviewed treatment for nipple bleb/ breast engorgement.     Follow up with Lactation: prn   Follow up with Pediatrician as scheduled

## 2025-04-21 NOTE — PATIENT INSTRUCTIONS
Birth Weight: 7 LB 1.6 OZ   Today's Naked Weight:  9 LB 2 OZ (4138 G)         PLAN FOR HOME:        Today we discussed a variety of topics. We reviewed Gracie's oral assessment, and discussed looking into complimentary therapies, and speech therapy. We discussed possible nipple bleb/ breast engorgement - and different treatments to help with. We reviewed latching and trying either the cross cradle hold or the football position.     Today we tried to latch Gracie on both sides without the nipple shield. She did more on the right side then the left.  She did not transfer much at the breast, but you did hand express into her mouth so she did get a few drops on both sides.  For home- when feeling up to trying, do not breast fed for more then 20 minutes total for a session.  She will need to be supplemented after each breast feeding session.  Gracie needs to fed 8-12 times a day. When having a bottle feeding session- 3-4 oz as tolerated per feeding.     Lots of skin to skin for home, and can still try to breast fed/ latch even for comfort.      Below is a summary of the topics we discussed today.  Can call with questions, and after trying some of the therapies we discussed at visit today can call to schedule a follow up visit.        FEEDING PLAN:     Breastfeeding frequency:  Make the best of 15-25 minutes (+/-) when feeding at breast, apply breast compressions and provide gentle stimulation as needed to encourage efficiency at breast.      Supplementation:  See paced bottle feeding below if supplementation by bottle is indicated.     Pumping: Goal would be 7-8 times per day  Adjust pump settings: increase vacuum/suction to maximum level that is comfortably tolerated ~ adjust stimulation mode/expression mode according to milk release.      Follow up: With Pediatrician as directed. With lactation as needed. Refer to additional support groups/resources below.                                          ADDITIONAL  INFORMATION:      Follow-up:  Call lactation [phone] as needed. Schedule follow-up lactation consult as needed.   Appointment with baby’s doctor planned.  Attend Mommy and Baby Support Group.  (check website [email or web address] under classes or call class registration at [phone])  Call your baby's doctor with questions as well     Oral Exercises     1) Tug of war with clean finger or pacifier        2) Provide frequent opportunity for tummy time, this will assist with stretching and strengthening supportive muscle related to suckling/seal formation.     3) If frequently spitting up or gassiness is of concern, place upright on parent chest following feeding approximately 10-20 minutes and burp often.         Snuggle your baby in skin to skin contact between and during feedings whenever possible.     Massage your breasts before nursing or pumping to soften areola if needed.     Breastfeed with hunger cues: Most babies will breastfeed 8-12 times every 24 hours with some clustered breastfeeding, especially during growth spurts.      Positioning:   Baby facing mom with mouth at nipple level. Bring infant to the breast not the breast to the infant.  Make sure infant is fully turned towards you with head aligned with the shoulders for latch and arms hugging you.  Baby should approach breast reaching up with the chin lifted.  Chin is deep into the breast and nose is slightly away from breast after latch.  Make small adjustments in position for your comfort and to help make space for the infant's nose if needed.     Deep Latching on:  Express drops of milk onto your baby’s lips to encourage latching if needed.  Aim your nipple to baby’s nose  Wait for a wide mouth and push your nipple in the mouth as you bring infant fully onto the breast.  Observe for mouth \"planted\" on the breast and for good jaw movement with suck.        Is baby taking enough breast milk?  Swallowing with most sucks (every 1-3 sucks) until  satisfied at least 8-12 times every 24 hours.  Compressing the breast when your baby sucks can increase milk flow.  At least 6-8 wet diapers and at least 3-4 soft, yellow seedy stools every 24 hours. Use the breastfeeding journal to keep a record.   Weight gain of at least 4-7 ounces per week for the first 3 months after return to birth weight.     Supplementation    Use your expressed breast milk as the supplement or formula if breast milk is not to volume infant requires.     Hour of Age  Intake (mL/feed)  1st 24 hours 2-10  24-48 hours 5-15  48-72 hours 15-30  72-96 hours 30-60  Day 10: 2-3 ounces per feeding.  4 weeks: 3-4 ounces or more per feeding.     Paced bottle feeding using a slow flow nipple:      Hold your baby in an upright position, supporting the hand and neck with your hand, rather than in the crook of your arm.   Let your baby “latch on” to the bottle: stroke nipple down from top lip to bottom, licking is good, wait for wide mouth and insert nipple with lips on base.  Angle the bottle so flow is slower. If the bottle is vertical milk will flow to quickly.  Pausing mimics breastfeeding and discourages “guzzling” the feeding.     Do I need to pump my breasts?  If supplementing:  Pump both breasts each time a supplement is given until infant nursing well.  Pump for 10-15 minutes using double electric breast pump.  Save all expressed breast milk for your infant.     Breastfeeding Journal:  Write down your baby’s feedings and diapers - if not meeting the guideline for number of diapers or feedings, call your baby’s doctor.                    Treatment  for a NIPPLE bleb    Recommended treatment for a milk blister usually consists of four steps: apply moist heat prior to nursing, clear the skin from the milk duct, nurse or pump with a hospital-grade pump, than if needed follow up with medication to aid healing. You may need to repeat this for several days (or longer) until the plugged duct opening stays  clear. Following are more detailed suggestions.    1. Apply moist heat to soften the blister prior to nursing a few times a day or  A cotton ball soaked with olive oil can be used to soften the skin instead of the wet compress.    2. Clear the skin from the milk duct.    This may not be necessary, as the combination of the heat and nursing/pumping should cause the skin to expand and the blister to open. However, it can be helpful to do one of the following at least once per day until skin no longer grows over the duct.    Rub the blister area with a moist washcloth.  If a plug is protruding from the nipple, you can gently pull on it with clean fingers.  Loosen an edge of the blister by gently scraping with your fingernail.    3. Nurse or pump with a hospital-grade pump. Nurse first on the breast with the milk blister, directly after applying heat.    Before you nurse, it can be helpful to use breast compression and attempt to hand express back behind and down toward the nipple to release any thickened milk that has backed up in the duct. Sometimes clumps or strings of hardened milk (often of a toothpaste consistency) can be expressed from this duct.      Additional treatments for recurring milk blisters:    Lecithin supplements can help to heal and prevent recurrent plugged ducts    Massaging the breast, areola and nipple with a massage oil of either  olive oil or coconut  can help to heal recurrent milk blisters.      Breastfeeding Suggestions for Engorgement     Breast compression as needed during nursing to increase infant swallowing and soften firm areas. This can work while pumping as well.    Pump to soften the breast if still uncomfortably full after nursing or baby is uninterested in nursing.    Apply cold compresses for 10-20 minutes on your breast if needed to decrease breast swelling after nursing or pumping:     Rest and drink plenty of fluids.    Check with your doctor about taking ibuprofen for relief  of swelling and discomfort.      Care for nipples until healed:      Express drops of breast milk on nipples before and after nursing (unless nipple thrush is present).  Use a hydrogel type dressing on your nipples between feedings. (Soothies or Ameda Comfort Gel pads)  Or, use Lanolin every time after breastfeeding. (Do not combine with use of gel pads)  If too sore to nurse on one or both breasts, pump one (or both) breast(s) to comfort every 2-3 hours. If nursing to contentment on one breast, this pumped milk can be stored for future use. If not nursing on either breast, feed baby your breast milk until able to return to breast.   Discuss use of all purpose nipple ointment with your OB doctor.   Call doctor if nipple has signs of infection: red/deep pink, drainage (pus), increased pain, fever.        Call your OB doctor with any signs of     mastitis (breast infection)     Plugged area(s) are not soft within 24 hours.   Breast becomes firm, reddened, or painful.   Fever, chills, or flu-like symptoms. Check your temperature 3 times daily until a plugged duct or mastitis resolves.     If mastitis occurs:  Continue breastfeeding and/or pumping - your milk is not infected.   Continue above treatment for relieving plugged area(s)  Continue to take antibiotic as prescribed even though you may quickly feel better.   Contact your doctor is you are not feeling significantly better within 1-2 days of starting antibiotic, sooner if symptoms worsen.     Follow up with your OB healthcare provider:  Call if a plugged duct or engorgement persists greater than 48 hours. Call if firm or reddened spots are present in breast with signs of fever, chills or flu like symptoms (possible breast infection/mastitis). Check your temperature during engorgement.      [facility] has great support for our families even after discharge.  We have virtual or in-person support groups.  Visit our website for the most up-to-date info for our  many different support groups. [email or web address]/[email or web address]-[email or web address]        New Moms Support Groups  Our weekly New Mom Support Groups are for any new parents in our community. They are led by an experienced Mother/Baby nurse or IBCLC and usually include a guest speaker on a topic of interest to new parents. These in-person groups also include Breastfeeding Support at each meeting. Bring your baby ( - 6 months) with you! Moms-to-be are also welcome! All mom's welcome even if its not your first.      MOM & BABY HOUR   Meets most  10:00 - 11:30 a.m.  Masks are not required, but be considerate of others and do not attend if mom or baby have had any symptoms of illness within the previous 24 hours. Breastfeeding support will be included at each session--just ask the leader any breastfeeding questions you may have. Location [facility] [place] [address] [place] Go inside the front door and to the right to the “Community Education Room”.     Mom's Line: [phone]  A phone line dedicated for women (or anyone worried about a women) who may be experiencing signs or symptoms of postpartum depression, anxiety, or overwhelmed with new baby. Call - answered by live trained mental health professionals, free and confidential, emotional support, referrals, in any language.     Nurturing Mom- A support group for new and expectant moms looking for support with the transition to parenthood as well as those experiencing symptoms of  anxiety and/or depression.  Please contact [email or web address] if you need directions or the link for the virtual meetings. Please contact [email or web address] if you plan to attend, but please be considerate of others and do not attend if mom or baby have had any symptoms of illness within the previous 24 hours.      [employer] for breast feeding and parent support, Website: IIIus.org  and for the [place] [facility]  and other groups visit [email or web address]/[email or web address]  to help find a group, all meetings are virtual.      Facebook groups-  for more support when home- [facility] --- you can find mom-to-mom advice and the list of speaker topics for cradle talk program.      Helpful websites:    www.llli.org  [email or web address]  [email or web address]

## (undated) DEVICE — STERILE SURGICAL LUBRICANT, METAL TUBE: Brand: SURGILUBE

## (undated) DEVICE — MYOSURE SINGLE USE SEAL SET

## (undated) DEVICE — SOCK CNSTR 4IN TNPSL UNV SPEC

## (undated) DEVICE — DEVICE SPEC RTRVL MYOSURE

## (undated) DEVICE — ENCORE® LATEX ACCLAIM SIZE 6, STERILE LATEX POWDER-FREE SURGICAL GLOVE: Brand: ENCORE

## (undated) DEVICE — HYSTEROSCOPY: Brand: MEDLINE INDUSTRIES, INC.

## (undated) DEVICE — LARGE, DISPOSABLE ALEXIS O C-SECTION PROTECTOR - RETRACTOR: Brand: ALEXIS ® O C-SECTION PROTECTOR - RETRACTOR

## (undated) DEVICE — PEN 6" SURGICAL MARKING PURPL

## (undated) DEVICE — SET TUBI Y FL CNTRL INFL/OTFL

## (undated) DEVICE — SOL  .9 3000ML